# Patient Record
Sex: FEMALE | Race: WHITE | NOT HISPANIC OR LATINO | Employment: FULL TIME | URBAN - NONMETROPOLITAN AREA
[De-identification: names, ages, dates, MRNs, and addresses within clinical notes are randomized per-mention and may not be internally consistent; named-entity substitution may affect disease eponyms.]

---

## 2021-02-08 ENCOUNTER — OFFICE VISIT (OUTPATIENT)
Dept: CARDIOLOGY | Facility: CLINIC | Age: 26
End: 2021-02-08

## 2021-02-08 VITALS
SYSTOLIC BLOOD PRESSURE: 121 MMHG | HEIGHT: 63 IN | HEART RATE: 105 BPM | WEIGHT: 152.4 LBS | DIASTOLIC BLOOD PRESSURE: 81 MMHG | OXYGEN SATURATION: 100 % | BODY MASS INDEX: 27 KG/M2

## 2021-02-08 DIAGNOSIS — R06.02 SOB (SHORTNESS OF BREATH): ICD-10-CM

## 2021-02-08 DIAGNOSIS — R00.2 PALPITATIONS: ICD-10-CM

## 2021-02-08 DIAGNOSIS — R55 SYNCOPE, UNSPECIFIED SYNCOPE TYPE: ICD-10-CM

## 2021-02-08 DIAGNOSIS — R07.2 PRECORDIAL PAIN: Primary | ICD-10-CM

## 2021-02-08 PROCEDURE — 99204 OFFICE O/P NEW MOD 45 MIN: CPT | Performed by: PHYSICIAN ASSISTANT

## 2021-02-08 PROCEDURE — 93000 ELECTROCARDIOGRAM COMPLETE: CPT | Performed by: PHYSICIAN ASSISTANT

## 2021-02-08 RX ORDER — MEDROXYPROGESTERONE ACETATE 150 MG/ML
INJECTION, SUSPENSION INTRAMUSCULAR
COMMUNITY
Start: 2021-01-29 | End: 2022-04-27

## 2021-02-08 NOTE — PROGRESS NOTES
"Oleg Patel is a 25 y.o. female     Chief Complaint   Patient presents with   • Establish Care     presents for cardiac eval   • Chest Pain   • Palpitations   • Shortness of Breath   • Dizziness       HPI  The patient presents into the clinic today for initial evaluation, referred because of a recent syncopal episode, but also because of chest pain and tachycardia.  The patient tells me that she had a syncopal episode 10 years ago which resulted in evaluation through cardiology services at that time.  An echo supported a \"leaky valve\", and an event monitor by her description was inconclusive.  She was lost to follow-up through cardiology at that time.  She had remained relatively asymptomatic up until a couple of weeks ago.  At that time, the patient reports that she had a syncopal episode.  This occurred in the very early morning hours, occurring just after sexual intercourse.  The patient reports that she developed weakness, dizziness, nausea, and diaphoresis.  She then had syncope.  She tells me that she was out for just a couple of minutes and eventually EMS was alerted.  Apparently there initial evaluation was largely unremarkable and she was not taken to the emergency room.  Ultimately, she stayed with her fiancé at that time, who reported that she had significant confusion throughout the remainder of the evening and up until the next morning hours.  The patient reports that she has since done well.  She does have intermittent chest discomfort at this time.  She describes mostly precordial aching.  She will have tachycardia at that time as well.  She does feel that her dyspnea has been fairly significant with her chest tightness and her tachycardia.  She has had no failure symptoms.  She did report symptoms to her primary care provider and has now been referred on for evaluation and presents today in that setting.      Current Outpatient Medications   Medication Sig Dispense Refill   • " medroxyPROGESTERone Acetate 150 MG/ML suspension prefilled syringe INJECT IN THE MUSCLE EVERY 12 WEEKS AS DIRECTED       No current facility-administered medications for this visit.        Macrobid [nitrofurantoin macrocrystal]    Past Medical History:   Diagnosis Date   • Anxiety    • Migraines        Social History     Socioeconomic History   • Marital status: Single     Spouse name: Not on file   • Number of children: Not on file   • Years of education: Not on file   • Highest education level: Not on file   Tobacco Use   • Smoking status: Current Every Day Smoker     Years: 2.00     Types: Cigarettes   • Smokeless tobacco: Never Used   Substance and Sexual Activity   • Alcohol use: Never     Frequency: Never   • Drug use: Never       Family History   Problem Relation Age of Onset   • No Known Problems Mother    • Hypertension Father        Review of Systems   Constitutional: Positive for diaphoresis (night sweats) and fatigue. Negative for chills and fever.   Eyes: Positive for visual disturbance.   Respiratory: Positive for chest tightness and shortness of breath (with daily activity and times at rest). Negative for apnea, cough and wheezing.    Cardiovascular: Positive for chest pain, palpitations and leg swelling.   Gastrointestinal: Positive for nausea. Negative for abdominal pain, blood in stool, constipation, diarrhea and vomiting.   Endocrine: Negative.    Genitourinary: Negative.  Negative for hematuria.   Musculoskeletal: Positive for back pain. Negative for arthralgias, myalgias and neck pain.   Allergic/Immunologic: Negative.  Negative for environmental allergies and food allergies.   Neurological: Positive for dizziness (with quick movement), syncope (last episode a couple weeks ago while having sex ), numbness (feet) and headaches. Negative for weakness.   Hematological: Bruises/bleeds easily.   Psychiatric/Behavioral: Positive for agitation and sleep disturbance (up and down). The patient is  "nervous/anxious.        Objective     Vitals:    02/08/21 1357 02/08/21 1358 02/08/21 1359   BP: 123/84 114/77 121/81   BP Location: Left arm Left arm Left arm   Patient Position: Lying Sitting Standing   Pulse: 111 105 105   SpO2: 100%     Weight: 69.1 kg (152 lb 6.4 oz)     Height: 160 cm (63\")          /81 (BP Location: Left arm, Patient Position: Standing)   Pulse 105   Ht 160 cm (63\")   Wt 69.1 kg (152 lb 6.4 oz)   SpO2 100%   BMI 27.00 kg/m²      Lab Results (most recent)     None          Physical Exam  Vitals signs and nursing note reviewed.   Constitutional:       General: She is not in acute distress.     Appearance: She is well-developed.   HENT:      Head: Normocephalic and atraumatic.   Eyes:      Conjunctiva/sclera: Conjunctivae normal.      Pupils: Pupils are equal, round, and reactive to light.   Neck:      Musculoskeletal: Normal range of motion and neck supple.      Vascular: No JVD.      Trachea: No tracheal deviation.   Cardiovascular:      Rate and Rhythm: Normal rate and regular rhythm.      Heart sounds: Normal heart sounds.   Pulmonary:      Effort: Pulmonary effort is normal.      Breath sounds: Normal breath sounds.   Abdominal:      General: Bowel sounds are normal. There is no distension.      Palpations: Abdomen is soft. There is no mass.      Tenderness: There is no abdominal tenderness. There is no guarding or rebound.   Musculoskeletal: Normal range of motion.         General: No tenderness or deformity.   Skin:     General: Skin is warm and dry.      Coloration: Skin is not pale.      Findings: No erythema or rash.   Neurological:      Mental Status: She is alert and oriented to person, place, and time.   Psychiatric:         Behavior: Behavior normal.         Thought Content: Thought content normal.         Judgment: Judgment normal.         Procedure     ECG 12 Lead    Date/Time: 2/8/2021 2:03 PM  Performed by: Dewayne Larose PA  Authorized by: Dewayne Larose PA "   Previous ECG: no previous ECG available  Comments: Sinus rhythm at 91, normal axis, early repolarization, no acute changes noted.                 Assessment/Plan      Diagnosis Plan   1. Precordial pain  ECG 12 Lead    Adult Transthoracic Echo Complete W/ Cont if Necessary Per Protocol    Cardiac Event Monitor    Treadmill Stress Test   2. Palpitations  ECG 12 Lead    Adult Transthoracic Echo Complete W/ Cont if Necessary Per Protocol    Cardiac Event Monitor    Treadmill Stress Test   3. SOB (shortness of breath)  ECG 12 Lead    Adult Transthoracic Echo Complete W/ Cont if Necessary Per Protocol    Cardiac Event Monitor    Treadmill Stress Test   4. Syncope, unspecified syncope type  ECG 12 Lead    Adult Transthoracic Echo Complete W/ Cont if Necessary Per Protocol    Cardiac Event Monitor    Treadmill Stress Test     1.  With the patient's syncope and symptoms otherwise, I will schedule for an event monitor.  We will schedule for a 14-day event monitor testing.  This will allow us to evaluate for dysrhythmic substrates potentially contributing to clinical course.    2.  I would schedule for an echo as well so that we may evaluate LV size and function, valvular morphologies, etc.    3.  I would schedule for regular treadmill stress test for risk stratification.    4.  The patient's orthostatic blood pressure checks have been unremarkable.  We have reviewed that with her today.    5.  If the above is unremarkable and symptoms should persist, I would then have a very low threshold for consideration of tilt table testing.  She has components to suggest potential neurocardiogenic syncope by description.  Still, I would like to pursue the above and then will recommend her further at that time if needed.  She will report back immediately for complications.  Further pending above evaluation and clinical course.           Wilma Patel  reports that she has been smoking cigarettes. She has smoked for the past 2.00  years. She has never used smokeless tobacco.. I have educated her on the risk of diseases from using tobacco products such as cancer, COPD and heart disease.     I advised her to quit and she is not willing to quit.    I spent 3  minutes counseling the patient.          Patient's Body mass index is 27 kg/m². BMI is above normal parameters. Recommendations include: educational material.           Electronically signed by:

## 2021-02-08 NOTE — PATIENT INSTRUCTIONS
For more information:    Quit Now [place]  1-800-QUIT-NOW  https://[place].quitlogix.org/en-[place]/  Steps to Quit Smoking  Smoking tobacco can be harmful to your health and can affect almost every organ in your body. Smoking puts you, and those around you, at risk for developing many serious chronic diseases. Quitting smoking is difficult, but it is one of the best things that you can do for your health. It is never too late to quit.  What are the benefits of quitting smoking?  When you quit smoking, you lower your risk of developing serious diseases and conditions, such as:  · Lung cancer or lung disease, such as COPD.  · Heart disease.  · Stroke.  · Heart attack.  · Infertility.  · Osteoporosis and bone fractures.  Additionally, symptoms such as coughing, wheezing, and shortness of breath may get better when you quit. You may also find that you get sick less often because your body is stronger at fighting off colds and infections. If you are pregnant, quitting smoking can help to reduce your chances of having a baby of low birth weight.  How do I get ready to quit?  When you decide to quit smoking, create a plan to make sure that you are successful. Before you quit:  · Pick a date to quit. Set a date within the next two weeks to give you time to prepare.  · Write down the reasons why you are quitting. Keep this list in places where you will see it often, such as on your bathroom mirror or in your car or wallet.  · Identify the people, places, things, and activities that make you want to smoke (triggers) and avoid them. Make sure to take these actions:  ¨ Throw away all cigarettes at home, at work, and in your car.  ¨ Throw away smoking accessories, such as ashtrays and lighters.  ¨ Clean your car and make sure to empty the ashtray.  ¨ Clean your home, including curtains and carpets.  · Tell your family, friends, and coworkers that you are quitting. Support from your loved ones can make quitting easier.  · Talk with  your health care provider about your options for quitting smoking.  · Find out what treatment options are covered by your health insurance.  What strategies can I use to quit smoking?  Talk with your healthcare provider about different strategies to quit smoking. Some strategies include:  · Quitting smoking altogether instead of gradually lessening how much you smoke over a period of time. Research shows that quitting “cold turkey” is more successful than gradually quitting.  · Attending in-person counseling to help you build problem-solving skills. You are more likely to have success in quitting if you attend several counseling sessions. Even short sessions of 10 minutes can be effective.  · Finding resources and support systems that can help you to quit smoking and remain smoke-free after you quit. These resources are most helpful when you use them often. They can include:  ¨ Online chats with a counselor.  ¨ Telephone quitlines.  ¨ Printed self-help materials.  ¨ Support groups or group counseling.  ¨ Text messaging programs.  ¨ Mobile phone applications.  · Taking medicines to help you quit smoking. (If you are pregnant or breastfeeding, talk with your health care provider first.) Some medicines contain nicotine and some do not. Both types of medicines help with cravings, but the medicines that include nicotine help to relieve withdrawal symptoms. Your health care provider may recommend:  ¨ Nicotine patches, gum, or lozenges.  ¨ Nicotine inhalers or sprays.  ¨ Non-nicotine medicine that is taken by mouth.  Talk with your health care provider about combining strategies, such as taking medicines while you are also receiving in-person counseling. Using these two strategies together makes you more likely to succeed in quitting than if you used either strategy on its own.  If you are pregnant or breastfeeding, talk with your health care provider about finding counseling or other support strategies to quit smoking. Do  not take medicine to help you quit smoking unless told to do so by your health care provider.  What things can I do to make it easier to quit?  Quitting smoking might feel overwhelming at first, but there is a lot that you can do to make it easier. Take these important actions:  · Reach out to your family and friends and ask that they support and encourage you during this time. Call telephone quitlines, reach out to support groups, or work with a counselor for support.  · Ask people who smoke to avoid smoking around you.  · Avoid places that trigger you to smoke, such as bars, parties, or smoke-break areas at work.  · Spend time around people who do not smoke.  · Lessen stress in your life, because stress can be a smoking trigger for some people. To lessen stress, try:  ¨ Exercising regularly.  ¨ Deep-breathing exercises.  ¨ Yoga.  ¨ Meditating.  ¨ Performing a body scan. This involves closing your eyes, scanning your body from head to toe, and noticing which parts of your body are particularly tense. Purposefully relax the muscles in those areas.  · Download or purchase mobile phone or tablet apps (applications) that can help you stick to your quit plan by providing reminders, tips, and encouragement. There are many free apps, such as QuitGuide from the CDC (Centers for Disease Control and Prevention). You can find other support for quitting smoking (smoking cessation) through smokefree.gov and other websites.  How will I feel when I quit smoking?  Within the first 24 hours of quitting smoking, you may start to feel some withdrawal symptoms. These symptoms are usually most noticeable 2-3 days after quitting, but they usually do not last beyond 2-3 weeks. Changes or symptoms that you might experience include:  · Mood swings.  · Restlessness, anxiety, or irritation.  · Difficulty concentrating.  · Dizziness.  · Strong cravings for sugary foods in addition to nicotine.  · Mild weight  gain.  · Constipation.  · Nausea.  · Coughing or a sore throat.  · Changes in how your medicines work in your body.  · A depressed mood.  · Difficulty sleeping (insomnia).  After the first 2-3 weeks of quitting, you may start to notice more positive results, such as:  · Improved sense of smell and taste.  · Decreased coughing and sore throat.  · Slower heart rate.  · Lower blood pressure.  · Clearer skin.  · The ability to breathe more easily.  · Fewer sick days.  Quitting smoking is very challenging for most people. Do not get discouraged if you are not successful the first time. Some people need to make many attempts to quit before they achieve long-term success. Do your best to stick to your quit plan, and talk with your health care provider if you have any questions or concerns.  This information is not intended to replace advice given to you by your health care provider. Make sure you discuss any questions you have with your health care provider.  Document Released: 12/12/2002 Document Revised: 08/15/2017 Document Reviewed: 05/03/2016  [employer] Patient Education © 2017 [employer]  Heart-Healthy Eating Plan  Many factors influence your heart (coronary) health, including eating and exercise habits. Coronary risk increases with abnormal blood fat (lipid) levels. Heart-healthy meal planning includes limiting unhealthy fats, increasing healthy fats, and making other diet and lifestyle changes.  What is my plan?  Your health care provider may recommend that you:  · Limit your fat intake to _________% or less of your total calories each day.  · Limit your saturated fat intake to _________% or less of your total calories each day.  · Limit the amount of cholesterol in your diet to less than _________ mg per day.  What are tips for following this plan?  Cooking  Cook foods using methods other than frying. Baking, boiling, grilling, and broiling are all good options. Other ways to reduce fat  include:  · Removing the skin from poultry.  · Removing all visible fats from meats.  · Steaming vegetables in water or broth.  Meal planning    · At meals, imagine dividing your plate into fourths:  ? Fill one-half of your plate with vegetables and green salads.  ? Fill one-fourth of your plate with whole grains.  ? Fill one-fourth of your plate with lean protein foods.  · Eat 4-5 servings of vegetables per day. One serving equals 1 cup raw or cooked vegetable, or 2 cups raw leafy greens.  · Eat 4-5 servings of fruit per day. One serving equals 1 medium whole fruit, ¼ cup dried fruit, ½ cup fresh, frozen, or canned fruit, or ½ cup 100% fruit juice.  · Eat more foods that contain soluble fiber. Examples include apples, broccoli, carrots, beans, peas, and barley. Aim to get 25-30 g of fiber per day.  · Increase your consumption of legumes, nuts, and seeds to 4-5 servings per week. One serving of dried beans or legumes equals ½ cup cooked, 1 serving of nuts is ¼ cup, and 1 serving of seeds equals 1 tablespoon.  Fats  · Choose healthy fats more often. Choose monounsaturated and polyunsaturated fats, such as olive and canola oils, flaxseeds, walnuts, almonds, and seeds.  · Eat more omega-3 fats. Choose salmon, mackerel, sardines, tuna, flaxseed oil, and ground flaxseeds. Aim to eat fish at least 2 times each week.  · Check food labels carefully to identify foods with trans fats or high amounts of saturated fat.  · Limit saturated fats. These are found in animal products, such as meats, butter, and cream. Plant sources of saturated fats include palm oil, palm kernel oil, and coconut oil.  · Avoid foods with partially hydrogenated oils in them. These contain trans fats. Examples are stick margarine, some tub margarines, cookies, crackers, and other baked goods.  · Avoid fried foods.  General information  · Eat more home-cooked food and less restaurant, buffet, and fast food.  · Limit or avoid alcohol.  · Limit foods that  are high in starch and sugar.  · Lose weight if you are overweight. Losing just 5-10% of your body weight can help your overall health and prevent diseases such as diabetes and heart disease.  · Monitor your salt (sodium) intake, especially if you have high blood pressure. Talk with your health care provider about your sodium intake.  · Try to incorporate more vegetarian meals weekly.  What foods can I eat?  Fruits  All fresh, canned (in natural juice), or frozen fruits.  Vegetables  Fresh or frozen vegetables (raw, steamed, roasted, or grilled). Green salads.  Grains  Most grains. Choose whole wheat and whole grains most of the time. Rice and pasta, including brown rice and pastas made with whole wheat.  Meats and other proteins  Lean, well-trimmed beef, veal, pork, and lamb. Chicken and turkey without skin. All fish and shellfish. Wild duck, rabbit, pheasant, and venison. Egg whites or low-cholesterol egg substitutes. Dried beans, peas, lentils, and tofu. Seeds and most nuts.  Dairy  Low-fat or nonfat cheeses, including ricotta and mozzarella. Skim or 1% milk (liquid, powdered, or evaporated). Buttermilk made with low-fat milk. Nonfat or low-fat yogurt.  Fats and oils  Non-hydrogenated (trans-free) margarines. Vegetable oils, including soybean, sesame, sunflower, olive, peanut, safflower, corn, canola, and cottonseed. Salad dressings or mayonnaise made with a vegetable oil.  Beverages  Water (mineral or sparkling). Coffee and tea. Diet carbonated beverages.  Sweets and desserts  Sherbet, gelatin, and fruit ice. Small amounts of dark chocolate.  Limit all sweets and desserts.  Seasonings and condiments  All seasonings and condiments.  The items listed above may not be a complete list of foods and beverages you can eat. Contact a dietitian for more options.  What foods are not recommended?  Fruits  Canned fruit in heavy syrup. Fruit in cream or butter sauce. Fried fruit. Limit coconut.  Vegetables  Vegetables cooked  in cheese, cream, or butter sauce. Fried vegetables.  Grains  Breads made with saturated or trans fats, oils, or whole milk. Croissants. Sweet rolls. Donuts. High-fat crackers, such as cheese crackers.  Meats and other proteins  Fatty meats, such as hot dogs, ribs, sausage, marie, rib-eye roast or steak. High-fat deli meats, such as salami and bologna. Caviar. Domestic duck and goose. Organ meats, such as liver.  Dairy  Cream, sour cream, cream cheese, and creamed cottage cheese. Whole milk cheeses. Whole or 2% milk (liquid, evaporated, or condensed). Whole buttermilk. Cream sauce or high-fat cheese sauce. Whole-milk yogurt.  Fats and oils  Meat fat, or shortening. Cocoa butter, hydrogenated oils, palm oil, coconut oil, palm kernel oil. Solid fats and shortenings, including marie fat, salt pork, lard, and butter. Nondairy cream substitutes. Salad dressings with cheese or sour cream.  Beverages  Regular sodas and any drinks with added sugar.  Sweets and desserts  Frosting. Pudding. Cookies. Cakes. Pies. Milk chocolate or white chocolate. Buttered syrups. Full-fat ice cream or ice cream drinks.  The items listed above may not be a complete list of foods and beverages to avoid. Contact a dietitian for more information.  Summary  · Heart-healthy meal planning includes limiting unhealthy fats, increasing healthy fats, and making other diet and lifestyle changes.  · Lose weight if you are overweight. Losing just 5-10% of your body weight can help your overall health and prevent diseases such as diabetes and heart disease.  · Focus on eating a balance of foods, including fruits and vegetables, low-fat or nonfat dairy, lean protein, nuts and legumes, whole grains, and heart-healthy oils and fats.  This information is not intended to replace advice given to you by your health care provider. Make sure you discuss any questions you have with your health care provider.  Document Revised: 01/25/2019 Document Reviewed:  01/25/2019  [employer] Patient Education © 2020 [employer]

## 2021-02-08 NOTE — PROGRESS NOTES
Subjective   Wilma Patel is a 25 y.o. female     Chief Complaint   Patient presents with   • Establish Care       HPI        Current Outpatient Medications   Medication Sig Dispense Refill   • medroxyPROGESTERone Acetate 150 MG/ML suspension prefilled syringe INJECT IN THE MUSCLE EVERY 12 WEEKS AS DIRECTED       No current facility-administered medications for this visit.        Macrobid [nitrofurantoin macrocrystal]    Past Medical History:   Diagnosis Date   • Anxiety    • Migraines        Social History     Socioeconomic History   • Marital status: Single     Spouse name: Not on file   • Number of children: Not on file   • Years of education: Not on file   • Highest education level: Not on file       No family history on file.    Review of Systems    Objective     There were no vitals filed for this visit.     There were no vitals taken for this visit.     Lab Results (most recent)     None          Physical Exam    Procedure   Procedures         Assessment/Plan     No diagnosis found.                 Electronically signed by:

## 2021-02-23 ENCOUNTER — TELEPHONE (OUTPATIENT)
Dept: CARDIOLOGY | Facility: CLINIC | Age: 26
End: 2021-02-23

## 2021-02-23 NOTE — TELEPHONE ENCOUNTER
Patient called triage - she has received her heart monitor and used it Friday night , woke up Saturday with redness on her chest , thinking to adhesive it to strong, spoke with patient told her she can call the company and have them send her sensitive strips, if those continue to bother her we will talk to a provider and see what else to do. AT [facility]

## 2021-03-25 ENCOUNTER — HOSPITAL ENCOUNTER (OUTPATIENT)
Dept: CARDIOLOGY | Facility: HOSPITAL | Age: 26
Discharge: HOME | End: 2021-03-25

## 2021-03-25 DIAGNOSIS — R00.2 PALPITATIONS: ICD-10-CM

## 2021-03-25 DIAGNOSIS — R55 SYNCOPE, UNSPECIFIED SYNCOPE TYPE: ICD-10-CM

## 2021-03-25 DIAGNOSIS — R06.02 SOB (SHORTNESS OF BREATH): ICD-10-CM

## 2021-03-25 DIAGNOSIS — R07.2 PRECORDIAL PAIN: ICD-10-CM

## 2021-03-25 PROCEDURE — 93306 TTE W/DOPPLER COMPLETE: CPT | Performed by: INTERNAL MEDICINE

## 2021-03-25 PROCEDURE — 93018 CV STRESS TEST I&R ONLY: CPT | Performed by: INTERNAL MEDICINE

## 2021-03-25 PROCEDURE — 93016 CV STRESS TEST SUPVJ ONLY: CPT | Performed by: NURSE PRACTITIONER

## 2021-03-25 PROCEDURE — 93017 CV STRESS TEST TRACING ONLY: CPT

## 2021-03-25 PROCEDURE — 93306 TTE W/DOPPLER COMPLETE: CPT

## 2021-03-28 LAB
BH CV STRESS BP STAGE 1: NORMAL
BH CV STRESS BP STAGE 2: NORMAL
BH CV STRESS BP STAGE 3: NORMAL
BH CV STRESS DURATION MIN STAGE 1: 3
BH CV STRESS DURATION MIN STAGE 2: 3
BH CV STRESS DURATION MIN STAGE 3: 3
BH CV STRESS DURATION SEC STAGE 1: 0
BH CV STRESS DURATION SEC STAGE 2: 0
BH CV STRESS DURATION SEC STAGE 3: 0
BH CV STRESS GRADE STAGE 1: 10
BH CV STRESS GRADE STAGE 2: 12
BH CV STRESS GRADE STAGE 3: 14
BH CV STRESS HR STAGE 1: 130
BH CV STRESS HR STAGE 2: 153
BH CV STRESS HR STAGE 3: 178
BH CV STRESS METS STAGE 1: 5
BH CV STRESS METS STAGE 2: 7.5
BH CV STRESS METS STAGE 3: 10
BH CV STRESS PROTOCOL 1: NORMAL
BH CV STRESS RECOVERY BP: NORMAL MMHG
BH CV STRESS RECOVERY HR: 98 BPM
BH CV STRESS SPEED STAGE 1: 1.7
BH CV STRESS SPEED STAGE 2: 2.5
BH CV STRESS SPEED STAGE 3: 3.4
BH CV STRESS STAGE 1: 1
BH CV STRESS STAGE 2: 2
BH CV STRESS STAGE 3: 3
MAXIMAL PREDICTED HEART RATE: 195 BPM
PERCENT MAX PREDICTED HR: 91.28 %
STRESS BASELINE BP: NORMAL MMHG
STRESS BASELINE HR: 71 BPM
STRESS PERCENT HR: 107 %
STRESS POST ESTIMATED WORKLOAD: 10.1 METS
STRESS POST EXERCISE DUR MIN: 7 MIN
STRESS POST EXERCISE DUR SEC: 10 SEC
STRESS POST PEAK BP: NORMAL MMHG
STRESS POST PEAK HR: 178 BPM
STRESS TARGET HR: 166 BPM

## 2021-03-30 ENCOUNTER — TELEPHONE (OUTPATIENT)
Dept: CARDIOLOGY | Facility: CLINIC | Age: 26
End: 2021-03-30

## 2021-03-30 NOTE — TELEPHONE ENCOUNTER
Patient aware of stress test results and doing well. She will keep follow up as scheduled and call with any concerns.     ----- Message from CATHY Gonzalez sent at 3/30/2021  8:36 AM EDT -----  Routine follow-up.    Result Text  1.  Adequate functional capacity without symptoms.  The patient exercised 7 minutes and 10 seconds on a Callum protocol to a heart rate of 178, systolic blood pressure 161, a rate-pressure product of 27,000, and an O2 consumption of 10.1 METS.  The patient achieved 91% of age adjusted maximum predicted heart rate.     2.  Somewhat exaggerated heart rate and physiologic blood pressure responses to exercise.     3.  No EKG evidence of ischemia.     4.  No exercise-induced dysrhythmia.

## 2021-04-08 LAB
BH CV ECHO MEAS - ACS: 2.3 CM
BH CV ECHO MEAS - AO MAX PG: 3.9 MMHG
BH CV ECHO MEAS - AO MEAN PG: 2 MMHG
BH CV ECHO MEAS - AO ROOT AREA (BSA CORRECTED): 1.8
BH CV ECHO MEAS - AO ROOT AREA: 7.5 CM^2
BH CV ECHO MEAS - AO ROOT DIAM: 3.1 CM
BH CV ECHO MEAS - AO V2 MAX: 99 CM/SEC
BH CV ECHO MEAS - AO V2 MEAN: 72.2 CM/SEC
BH CV ECHO MEAS - AO V2 VTI: 21 CM
BH CV ECHO MEAS - BSA(HAYCOCK): 1.8 M^2
BH CV ECHO MEAS - BSA: 1.7 M^2
BH CV ECHO MEAS - BZI_BMI: 26.9 KILOGRAMS/M^2
BH CV ECHO MEAS - BZI_METRIC_HEIGHT: 160 CM
BH CV ECHO MEAS - BZI_METRIC_WEIGHT: 68.9 KG
BH CV ECHO MEAS - EDV(CUBED): 109.2 ML
BH CV ECHO MEAS - EDV(MOD-SP4): 58.1 ML
BH CV ECHO MEAS - EDV(TEICH): 106.5 ML
BH CV ECHO MEAS - EF(CUBED): 62.1 %
BH CV ECHO MEAS - EF(MOD-SP4): 47.5 %
BH CV ECHO MEAS - EF(TEICH): 53.5 %
BH CV ECHO MEAS - ESV(CUBED): 41.4 ML
BH CV ECHO MEAS - ESV(MOD-SP4): 30.5 ML
BH CV ECHO MEAS - ESV(TEICH): 49.5 ML
BH CV ECHO MEAS - FS: 27.6 %
BH CV ECHO MEAS - IVS/LVPW: 1.1
BH CV ECHO MEAS - IVSD: 0.93 CM
BH CV ECHO MEAS - LA DIMENSION: 3.5 CM
BH CV ECHO MEAS - LA/AO: 1.1
BH CV ECHO MEAS - LV DIASTOLIC VOL/BSA (35-75): 33.8 ML/M^2
BH CV ECHO MEAS - LV IVRT: 0.11 SEC
BH CV ECHO MEAS - LV MASS(C)D: 140.9 GRAMS
BH CV ECHO MEAS - LV MASS(C)DI: 81.9 GRAMS/M^2
BH CV ECHO MEAS - LV SYSTOLIC VOL/BSA (12-30): 17.7 ML/M^2
BH CV ECHO MEAS - LVIDD: 4.8 CM
BH CV ECHO MEAS - LVIDS: 3.5 CM
BH CV ECHO MEAS - LVLD AP4: 6 CM
BH CV ECHO MEAS - LVLS AP4: 5 CM
BH CV ECHO MEAS - LVOT AREA (M): 3.5 CM^2
BH CV ECHO MEAS - LVOT AREA: 3.5 CM^2
BH CV ECHO MEAS - LVOT DIAM: 2.1 CM
BH CV ECHO MEAS - LVPWD: 0.81 CM
BH CV ECHO MEAS - MV A MAX VEL: 51 CM/SEC
BH CV ECHO MEAS - MV DEC SLOPE: 161 CM/SEC^2
BH CV ECHO MEAS - MV E MAX VEL: 64.3 CM/SEC
BH CV ECHO MEAS - MV E/A: 1.3
BH CV ECHO MEAS - RVDD: 3 CM
BH CV ECHO MEAS - SI(AO): 92.1 ML/M^2
BH CV ECHO MEAS - SI(CUBED): 39.4 ML/M^2
BH CV ECHO MEAS - SI(MOD-SP4): 16 ML/M^2
BH CV ECHO MEAS - SI(TEICH): 33.1 ML/M^2
BH CV ECHO MEAS - SV(AO): 158.5 ML
BH CV ECHO MEAS - SV(CUBED): 67.8 ML
BH CV ECHO MEAS - SV(MOD-SP4): 27.6 ML
BH CV ECHO MEAS - SV(TEICH): 57 ML
MAXIMAL PREDICTED HEART RATE: 195 BPM
STRESS TARGET HR: 166 BPM

## 2021-04-15 ENCOUNTER — TELEPHONE (OUTPATIENT)
Dept: CARDIOLOGY | Facility: CLINIC | Age: 26
End: 2021-04-15

## 2021-04-15 NOTE — TELEPHONE ENCOUNTER
Patient aware of echo results. Follow up moved to Monday.Blanca Pressley MA      ----- Message from Bridgett Rowe sent at 4/15/2021  1:04 PM EDT -----    ----- Message -----  From: Dewayne Larose PA  Sent: 4/14/2021   5:57 PM EDT  To: Babs De Jesus MA    Follow-up 2 to 3 weeks.  Please advise patient of test results.       Result Text  1.  LV size is normal and global LV systolic function is in the low normal to mildly depressed range with a visually estimated ejection fraction of 45 to 50%.  Normal LV wall thickness, normal LV wall motion, and normal LV diastolic function and filling pressures.  The atria and right ventricle are unremarkable.     2.  Valves are morphologically and physiologically normal with no stenotic lesions, valve associated masses or thrombi, or hemodynamically significant regurgitation.     3.  No pericardial or great vessel pathology.     4.  Pulmonary artery pressures cannot be calculated but are likely not significantly elevated.

## 2021-04-19 ENCOUNTER — OFFICE VISIT (OUTPATIENT)
Dept: CARDIOLOGY | Facility: CLINIC | Age: 26
End: 2021-04-19

## 2021-04-19 VITALS
HEIGHT: 63 IN | BODY MASS INDEX: 29.91 KG/M2 | DIASTOLIC BLOOD PRESSURE: 80 MMHG | HEART RATE: 98 BPM | SYSTOLIC BLOOD PRESSURE: 112 MMHG | WEIGHT: 168.8 LBS | OXYGEN SATURATION: 99 %

## 2021-04-19 DIAGNOSIS — R55 SYNCOPE, UNSPECIFIED SYNCOPE TYPE: Primary | ICD-10-CM

## 2021-04-19 DIAGNOSIS — R00.2 PALPITATIONS: ICD-10-CM

## 2021-04-19 DIAGNOSIS — R06.02 SOB (SHORTNESS OF BREATH): ICD-10-CM

## 2021-04-19 PROCEDURE — 99213 OFFICE O/P EST LOW 20 MIN: CPT | Performed by: PHYSICIAN ASSISTANT

## 2021-04-19 NOTE — PROGRESS NOTES
Problem list     Subjective   Wilma Patel is a 25 y.o. female     Chief Complaint   Patient presents with   • Follow-up       HPI  This pleasant patient presents to the clinic today to review study results.  We had seen her initially because of presyncopal episodes/syncope, chest discomfort, and dyspnea.  She was subsequent scheduled for testing.  Event monitor indicated sinus rhythm throughout without significant dysrhythmic activity.  Stress test indicated no evidence of ischemia, with exercise/peak heart rate occurring at 7 minutes and 10 seconds per Callum protocol.  She had no ischemic changes nor dysrhythmic activity.  Echo supported possible mild depression in systolic function EF 45 to 50% but with normal LV diastolic function and normal parameters otherwise.    Current Outpatient Medications on File Prior to Visit   Medication Sig Dispense Refill   • medroxyPROGESTERone Acetate 150 MG/ML suspension prefilled syringe INJECT IN THE MUSCLE EVERY 12 WEEKS AS DIRECTED       No current facility-administered medications on file prior to visit.       Macrobid [nitrofurantoin macrocrystal]    Past Medical History:   Diagnosis Date   • Anxiety    • Migraines        Social History     Socioeconomic History   • Marital status: Single     Spouse name: Not on file   • Number of children: Not on file   • Years of education: Not on file   • Highest education level: Not on file   Tobacco Use   • Smoking status: Current Every Day Smoker     Years: 2.00     Types: Cigarettes   • Smokeless tobacco: Never Used   Substance and Sexual Activity   • Alcohol use: Never   • Drug use: Never       Family History   Problem Relation Age of Onset   • No Known Problems Mother    • Hypertension Father        Review of Systems   Constitutional: Negative.  Negative for chills, fatigue and fever.   HENT: Negative for congestion, rhinorrhea and sore throat.    Eyes: Positive for visual disturbance (glasses).   Respiratory: Negative for chest  "tightness, shortness of breath and wheezing.    Cardiovascular: Positive for leg swelling. Negative for chest pain and palpitations.   Gastrointestinal: Negative.    Endocrine: Negative.  Negative for cold intolerance.   Genitourinary: Negative.    Musculoskeletal: Negative.  Negative for arthralgias, back pain and neck pain.   Skin: Negative.  Negative for rash and wound.   Allergic/Immunologic: Positive for environmental allergies.   Neurological: Positive for numbness (legs) and headaches. Negative for dizziness and weakness.   Hematological: Bruises/bleeds easily (bruises/ bleeds).   Psychiatric/Behavioral: Negative for sleep disturbance (staying asleep ).       Objective   Vitals:    04/19/21 1126   BP: 112/80   BP Location: Left arm   Patient Position: Sitting   Pulse: 98   SpO2: 99%   Weight: 76.6 kg (168 lb 12.8 oz)   Height: 160 cm (63\")      /80 (BP Location: Left arm, Patient Position: Sitting)   Pulse 98   Ht 160 cm (63\")   Wt 76.6 kg (168 lb 12.8 oz)   SpO2 99%   BMI 29.90 kg/m²    Lab Results (most recent)     None        Physical Exam  Vitals and nursing note reviewed.   Constitutional:       General: She is not in acute distress.     Appearance: She is well-developed.   HENT:      Head: Normocephalic and atraumatic.   Eyes:      Conjunctiva/sclera: Conjunctivae normal.      Pupils: Pupils are equal, round, and reactive to light.   Neck:      Vascular: No JVD.      Trachea: No tracheal deviation.   Cardiovascular:      Rate and Rhythm: Normal rate and regular rhythm.      Heart sounds: Normal heart sounds.   Pulmonary:      Effort: Pulmonary effort is normal.      Breath sounds: Normal breath sounds.   Abdominal:      General: Bowel sounds are normal. There is no distension.      Palpations: Abdomen is soft. There is no mass.      Tenderness: There is no abdominal tenderness. There is no guarding or rebound.   Musculoskeletal:         General: No tenderness or deformity. Normal range of " motion.      Cervical back: Normal range of motion and neck supple.   Skin:     General: Skin is warm and dry.      Coloration: Skin is not pale.      Findings: No erythema or rash.   Neurological:      Mental Status: She is alert and oriented to person, place, and time.   Psychiatric:         Behavior: Behavior normal.         Thought Content: Thought content normal.         Judgment: Judgment normal.           Procedure   Procedures       Assessment/Plan      Diagnosis Plan   1. Syncope, unspecified syncope type     2. SOB (shortness of breath)     3. Palpitations       1.  The patient presents today to review study results.  Because of syncope and symptoms otherwise as above, the patient was scheduled for testing.  Stress test indicated no evidence of ischemia.  Event monitor indicated no dysrhythmic symptoms.  We have reviewed that in detail with her today.    2.  She has had no further syncopal episodes.  For future episodes, I would have a low threshold for consideration of tilt table testing.  She has some components of vasovagal syncope by description.    3.  In that setting, we have reviewed physical maneuvers/lifestyle changes to address empirically vasovagal syncope.    4.  Her echo did support a low normal to mildly depressed systolic function estimated EF at 45 to 50%.  She however had normal diastolic function and normal parameters otherwise.  I do not think this reflects any significant pathology at this time, but would repeat a limited echo in 2 to 3 months to reevaluate systolic function, LV size, diastolic parameters, and cardiac anatomy otherwise.  She will call for any issues immediately prior to follow-up on that echocardiogram.    5.  I will make no adjustments in medications.  We will see her routinely.  For the most part, I feel she is doing fairly well at this time from cardiovascular standpoint.           Wilma Patel  reports that she has been smoking cigarettes. She has smoked for the  past 2.00 years. She has never used smokeless tobacco.. I have educated her on the risk of diseases from using tobacco products such as cancer, COPD and heart disease.     I advised her to quit and she is not willing to quit.    I spent 3  minutes counseling the patient.          Patient's Body mass index is 29.9 kg/m². BMI is above normal parameters. Recommendations include: educational material.             Electronically signed by:

## 2021-04-19 NOTE — PATIENT INSTRUCTIONS

## 2022-04-27 ENCOUNTER — OFFICE VISIT (OUTPATIENT)
Dept: CARDIOLOGY | Facility: CLINIC | Age: 27
End: 2022-04-27

## 2022-04-27 VITALS
SYSTOLIC BLOOD PRESSURE: 118 MMHG | HEIGHT: 66 IN | BODY MASS INDEX: 30.05 KG/M2 | WEIGHT: 187 LBS | DIASTOLIC BLOOD PRESSURE: 77 MMHG | HEART RATE: 76 BPM | OXYGEN SATURATION: 99 %

## 2022-04-27 DIAGNOSIS — R07.9 CHEST PAIN, UNSPECIFIED TYPE: Primary | ICD-10-CM

## 2022-04-27 DIAGNOSIS — R00.2 PALPITATIONS: ICD-10-CM

## 2022-04-27 DIAGNOSIS — R06.02 SOB (SHORTNESS OF BREATH): ICD-10-CM

## 2022-04-27 DIAGNOSIS — R55 SYNCOPE AND COLLAPSE: ICD-10-CM

## 2022-04-27 PROCEDURE — 93000 ELECTROCARDIOGRAM COMPLETE: CPT | Performed by: PHYSICIAN ASSISTANT

## 2022-04-27 PROCEDURE — 99214 OFFICE O/P EST MOD 30 MIN: CPT | Performed by: PHYSICIAN ASSISTANT

## 2022-04-27 RX ORDER — HYDROXYZINE HYDROCHLORIDE 10 MG/1
10 TABLET, FILM COATED ORAL EVERY 8 HOURS PRN
COMMUNITY
Start: 2022-04-07 | End: 2022-04-27

## 2022-04-27 RX ORDER — BUPROPION HYDROCHLORIDE 75 MG/1
75 TABLET ORAL 2 TIMES DAILY
COMMUNITY
Start: 2022-04-07

## 2022-04-27 RX ORDER — METOPROLOL SUCCINATE 25 MG/1
12.5 TABLET, EXTENDED RELEASE ORAL DAILY
Qty: 30 TABLET | Refills: 11 | Status: SHIPPED | OUTPATIENT
Start: 2022-04-27

## 2022-04-27 NOTE — PROGRESS NOTES
Problem list     Subjective   Wilma Patel is a 26 y.o. female     Chief Complaint   Patient presents with   • Palpitations       HPI    Patient is a 26-year-old female who presents to the office for evaluation.  Patient has been followed routinely in the office.  She has had history of syncopal episodes and a degree of neurocardiogenic syncope.  Patient underwent testing in 2021 to include event monitor with no significant arrhythmia other than rare premature beats.  Patient with regular treadmill stress test that was negative for any electrocardiographic evidence of ischemia and echo was largely benign as well    Patient continues to feel palpitations.  She will feel as if her heart is racing or beating irregular at times.  She has a degree of mild dyspnea when exerting or doing activity.  No complaints of PND orthopnea.    Patient does occasionally feel some slight discomfort in her chest but most of her symptoms are chronic.  Otherwise patient is doing well.    Current Outpatient Medications on File Prior to Visit   Medication Sig Dispense Refill   • buPROPion (WELLBUTRIN) 75 MG tablet Take 75 mg by mouth 2 (Two) Times a Day.     • [DISCONTINUED] hydrOXYzine (ATARAX) 10 MG tablet Take 10 mg by mouth Every 8 (Eight) Hours As Needed.     • [DISCONTINUED] medroxyPROGESTERone Acetate 150 MG/ML suspension prefilled syringe INJECT IN THE MUSCLE EVERY 12 WEEKS AS DIRECTED       No current facility-administered medications on file prior to visit.       Latex and Macrobid [nitrofurantoin macrocrystal]    Past Medical History:   Diagnosis Date   • Anxiety    • Depression    • Migraines        Social History     Socioeconomic History   • Marital status: Single   Tobacco Use   • Smoking status: Former Smoker     Years: 2.00     Types: Cigarettes   • Smokeless tobacco: Never Used   Substance and Sexual Activity   • Alcohol use: Never   • Drug use: Never       Family History   Problem Relation Age of Onset   • No Known  "Problems Mother    • Hypertension Father        Review of Systems   Constitutional: Positive for fatigue. Negative for chills and fever.   HENT: Negative.  Negative for congestion and sinus pressure.    Respiratory: Positive for shortness of breath. Negative for chest tightness.    Cardiovascular: Positive for chest pain, palpitations and leg swelling.   Musculoskeletal: Positive for back pain (between shoulder since yesterday). Negative for neck pain.   Neurological: Positive for dizziness. Negative for syncope and light-headedness.   Psychiatric/Behavioral: Positive for sleep disturbance (wakes with palps, cp and soa).       Objective   Vitals:    04/27/22 1053   BP: 118/77   BP Location: Left arm   Patient Position: Sitting   Pulse: 76   SpO2: 99%   Weight: 84.8 kg (187 lb)   Height: 167.6 cm (66\")      /77 (BP Location: Left arm, Patient Position: Sitting)   Pulse 76   Ht 167.6 cm (66\")   Wt 84.8 kg (187 lb)   SpO2 99%   BMI 30.18 kg/m²     Lab Results (most recent)     None          Physical Exam  Vitals and nursing note reviewed.   Constitutional:       General: She is not in acute distress.     Appearance: Normal appearance. She is well-developed.   HENT:      Head: Normocephalic and atraumatic.   Eyes:      General: No scleral icterus.        Right eye: No discharge.         Left eye: No discharge.      Conjunctiva/sclera: Conjunctivae normal.   Neck:      Vascular: No carotid bruit.   Cardiovascular:      Rate and Rhythm: Normal rate and regular rhythm.      Heart sounds: Normal heart sounds. No murmur heard.    No friction rub. No gallop.   Pulmonary:      Effort: Pulmonary effort is normal. No respiratory distress.      Breath sounds: Normal breath sounds. No wheezing or rales.   Chest:      Chest wall: No tenderness.   Musculoskeletal:      Right lower leg: No edema.      Left lower leg: No edema.   Skin:     General: Skin is warm and dry.      Coloration: Skin is not pale.      Findings: No " erythema or rash.   Neurological:      Mental Status: She is alert and oriented to person, place, and time.      Cranial Nerves: No cranial nerve deficit.   Psychiatric:         Behavior: Behavior normal.         Procedure     ECG 12 Lead    Date/Time: 4/27/2022 10:57 AM  Performed by: Harley Soares PA  Authorized by: Harley Soares PA   Comparison: compared with previous ECG from 2/8/2021  Comments: EKG demonstrates sinus arrhythmia at 67 bpm with no acute ST changes               Assessment/Plan     Problems Addressed this Visit        Cardiac and Vasculature    Palpitations    Relevant Orders    Tilt Table    CT Head Without Contrast    Chest pain - Primary    Relevant Orders    Tilt Table    CT Head Without Contrast       Pulmonary and Pneumonias    SOB (shortness of breath)    Relevant Orders    Tilt Table    CT Head Without Contrast       Symptoms and Signs    Syncope and collapse    Relevant Orders    Tilt Table    CT Head Without Contrast      Diagnoses       Codes Comments    Chest pain, unspecified type    -  Primary ICD-10-CM: R07.9  ICD-9-CM: 786.50     SOB (shortness of breath)     ICD-10-CM: R06.02  ICD-9-CM: 786.05     Palpitations     ICD-10-CM: R00.2  ICD-9-CM: 785.1     Syncope and collapse     ICD-10-CM: R55  ICD-9-CM: 780.2           Recommendation  1.  Patient is a 26-year-old female with chronic complaints.  I feel much of her history of syncope is likely related to neurocardiogenic issues.  She would like to have tilt table testing performed.  We will schedule accordingly    2.  Otherwise her cardiac testing has been largely benign.  I am placing her on a very low-dose 12.5 mg of Toprol daily.  She has any adverse effects, I recommend her stopping that medication as discussed    3.  She will like to have a CT of the head performed.  I do not see where she has had any head trauma.  We will schedule CT for rule out.  We will see her back for follow-up on testing.  Follow-up with primary  as scheduled           Patient brought in medicine bottles to appointment, they have been gone through with the patient. Med list was updated in the chart.                  Electronically signed by:

## 2022-05-18 ENCOUNTER — HOSPITAL ENCOUNTER (OUTPATIENT)
Dept: CARDIOLOGY | Facility: HOSPITAL | Age: 27
Discharge: HOME | End: 2022-05-18
Admitting: PHYSICIAN ASSISTANT

## 2022-05-18 VITALS
BODY MASS INDEX: 30.05 KG/M2 | DIASTOLIC BLOOD PRESSURE: 80 MMHG | HEART RATE: 72 BPM | SYSTOLIC BLOOD PRESSURE: 110 MMHG | HEIGHT: 66 IN | WEIGHT: 186.95 LBS

## 2022-05-18 DIAGNOSIS — R07.9 CHEST PAIN, UNSPECIFIED TYPE: ICD-10-CM

## 2022-05-18 DIAGNOSIS — R55 SYNCOPE AND COLLAPSE: ICD-10-CM

## 2022-05-18 DIAGNOSIS — R06.02 SOB (SHORTNESS OF BREATH): ICD-10-CM

## 2022-05-18 DIAGNOSIS — R00.2 PALPITATIONS: ICD-10-CM

## 2022-05-18 PROCEDURE — 93660 TILT TABLE EVALUATION: CPT

## 2022-05-18 PROCEDURE — 93660 TILT TABLE EVALUATION: CPT | Performed by: INTERNAL MEDICINE

## 2022-05-19 LAB
MAXIMAL PREDICTED HEART RATE: 194 BPM
STRESS TARGET HR: 165 BPM

## 2022-05-23 ENCOUNTER — TELEPHONE (OUTPATIENT)
Dept: CARDIOLOGY | Facility: CLINIC | Age: 27
End: 2022-05-23

## 2022-05-23 NOTE — TELEPHONE ENCOUNTER
TILT TABLE  Called pt to notify of no acute findings on testing, no answer lvm.  ----- Message from Chelsie Acevedo LPN sent at 5/23/2022 11:08 AM EDT -----    ----- Message -----  From: Harley Soares PA  Sent: 5/23/2022  10:55 AM EDT  To: Chelsie Acevedo LPN    Routine follow-up

## 2023-09-28 ENCOUNTER — TELEPHONE (OUTPATIENT)
Dept: CARDIOLOGY | Facility: CLINIC | Age: 28
End: 2023-09-28

## 2023-09-28 ENCOUNTER — OFFICE VISIT (OUTPATIENT)
Dept: CARDIOLOGY | Facility: CLINIC | Age: 28
End: 2023-09-28
Payer: COMMERCIAL

## 2023-09-28 VITALS
SYSTOLIC BLOOD PRESSURE: 155 MMHG | BODY MASS INDEX: 29.89 KG/M2 | OXYGEN SATURATION: 100 % | DIASTOLIC BLOOD PRESSURE: 94 MMHG | HEART RATE: 134 BPM | HEIGHT: 66 IN | WEIGHT: 186 LBS

## 2023-09-28 DIAGNOSIS — R42 DIZZINESS: ICD-10-CM

## 2023-09-28 DIAGNOSIS — R06.02 SOB (SHORTNESS OF BREATH): ICD-10-CM

## 2023-09-28 DIAGNOSIS — R00.2 PALPITATIONS: Primary | ICD-10-CM

## 2023-09-28 PROCEDURE — 93000 ELECTROCARDIOGRAM COMPLETE: CPT | Performed by: PHYSICIAN ASSISTANT

## 2023-09-28 PROCEDURE — 99214 OFFICE O/P EST MOD 30 MIN: CPT | Performed by: PHYSICIAN ASSISTANT

## 2023-09-28 RX ORDER — NEBIVOLOL 2.5 MG/1
2.5 TABLET ORAL DAILY
Qty: 90 TABLET | Refills: 3 | Status: SHIPPED | OUTPATIENT
Start: 2023-09-28

## 2023-09-28 RX ORDER — HYDRALAZINE HYDROCHLORIDE 10 MG/1
10 TABLET, FILM COATED ORAL AS NEEDED
COMMUNITY

## 2023-09-28 NOTE — PROGRESS NOTES
Problem list     Subjective   Wilma Patel is a 27 y.o. female     Chief Complaint   Patient presents with    Follow-up     5 month / testing review     Chest Pain    Shortness of Breath       HPI    Patient is a 27-year-old female who presents back to the office to be evaluated.    She presents back poorly.  She describes going to call the emergency room because of symptoms.  She has felt significant arm pain.  She has had dizziness and difficulty walking.    She is tearful on examination.  She has felt her heart racing.  She currently is lying in a stretcher at this time.  She does not describe significant pain although she has had some pain in the past.  She has felt significant arm pain.  Vitals have been reviewed.  She is stable at this time.      Current Outpatient Medications on File Prior to Visit   Medication Sig Dispense Refill    hydrALAZINE (APRESOLINE) 10 MG tablet Take 1 tablet by mouth As Needed.      [DISCONTINUED] buPROPion (WELLBUTRIN) 75 MG tablet Take 75 mg by mouth 2 (Two) Times a Day. (Patient not taking: Reported on 9/28/2023)      [DISCONTINUED] metoprolol succinate XL (TOPROL-XL) 25 MG 24 hr tablet Take 0.5 tablets by mouth Daily. (Patient not taking: Reported on 9/28/2023) 30 tablet 11     No current facility-administered medications on file prior to visit.       Latex and Macrobid [nitrofurantoin macrocrystal]    Past Medical History:   Diagnosis Date    Anxiety     Depression     Migraines        Social History     Socioeconomic History    Marital status: Single   Tobacco Use    Smoking status: Former     Years: 2.00     Types: Cigarettes    Smokeless tobacco: Never   Substance and Sexual Activity    Alcohol use: Never    Drug use: Never       Family History   Problem Relation Age of Onset    No Known Problems Mother     Hypertension Father        Review of Systems   Constitutional:  Positive for chills and fatigue. Negative for fever.   HENT: Negative.  Negative for congestion,  "rhinorrhea and sore throat.    Eyes: Negative.    Respiratory:  Positive for chest tightness and shortness of breath.    Cardiovascular:  Positive for palpitations. Negative for chest pain and leg swelling.   Gastrointestinal: Negative.    Endocrine: Negative.    Genitourinary: Negative.    Musculoskeletal: Negative.  Negative for arthralgias, back pain and neck pain.   Skin: Negative.  Negative for rash and wound.   Allergic/Immunologic: Negative.  Negative for environmental allergies.   Neurological:  Positive for dizziness and light-headedness. Negative for weakness, numbness and headaches.   Hematological: Negative.  Does not bruise/bleed easily.   Psychiatric/Behavioral: Negative.  Negative for sleep disturbance.      Objective   Vitals:    09/28/23 0852   BP: 155/94   Pulse: (!) 134   SpO2: 100%   Weight: 84.4 kg (186 lb)   Height: 167.6 cm (65.98\")      /94   Pulse (!) 134   Ht 167.6 cm (65.98\")   Wt 84.4 kg (186 lb)   SpO2 100%   BMI 30.04 kg/m²     Lab Results (most recent)       None            Physical Exam  Vitals and nursing note reviewed.   Constitutional:       General: She is not in acute distress.     Appearance: Normal appearance. She is well-developed.   HENT:      Head: Normocephalic and atraumatic.   Eyes:      General: No scleral icterus.        Right eye: No discharge.         Left eye: No discharge.      Conjunctiva/sclera: Conjunctivae normal.   Neck:      Vascular: No carotid bruit.   Cardiovascular:      Rate and Rhythm: Regular rhythm. Tachycardia present.      Heart sounds: Normal heart sounds. No murmur heard.    No friction rub. No gallop.   Pulmonary:      Effort: Pulmonary effort is normal. No respiratory distress.      Breath sounds: Normal breath sounds. No wheezing or rales.   Chest:      Chest wall: No tenderness.   Musculoskeletal:      Right lower leg: No edema.      Left lower leg: No edema.   Skin:     General: Skin is warm and dry.      Coloration: Skin is not " pale.      Findings: No erythema or rash.   Neurological:      Mental Status: She is alert and oriented to person, place, and time.      Cranial Nerves: No cranial nerve deficit.   Psychiatric:         Behavior: Behavior normal.       Procedure     ECG 12 Lead    Date/Time: 9/28/2023 9:01 AM  Performed by: Harley Soares PA  Authorized by: Harley Soares PA   Comparison: compared with previous ECG from 4/27/2022  Comments: EKG demonstrates sinus tachycardia at 119 bpm with no acute ST changes           Assessment & Plan     Problems Addressed this Visit          Cardiac and Vasculature    Palpitations - Primary    Relevant Orders    Mobile Cardiac Outpatient Telemetry       Pulmonary and Pneumonias    SOB (shortness of breath)       Symptoms and Signs    Dizziness    Relevant Orders    Mobile Cardiac Outpatient Telemetry     Diagnoses         Codes Comments    Palpitations    -  Primary ICD-10-CM: R00.2  ICD-9-CM: 785.1     Dizziness     ICD-10-CM: R42  ICD-9-CM: 780.4     SOB (shortness of breath)     ICD-10-CM: R06.02  ICD-9-CM: 786.05               Recommendation  1.  Patient is a 27-year-old female who presents to the office to be evaluated.  Patient has palpitations and shortness of breath.  She also complains of significant dizziness.  She describes that she has difficulty walking.  She feels she needs emergency room evaluation.  Because of her inability to walk safely due to significant dizziness, we will have her transported to the emergency room via EMS.    2.  I do want her to wear an event monitor and we will order that because of her palpitations.  We can see her back for follow-up after emergency room.  Follow-up with primary as scheduled.              Electronically signed by:

## 2023-09-28 NOTE — LETTER
September 28, 2023       No Recipients    Patient: Wilma Patel   YOB: 1995   Date of Visit: 9/28/2023       Dear BOBBI Swartz    Wilma Patel was in my office today. Below is a copy of my note.    If you have questions, please do not hesitate to call me. I look forward to following Wilma along with you.         Sincerely,        CATHY Reid        CC:   No Recipients    Problem list     Subjective  Wilma Patel is a 27 y.o. female     Chief Complaint   Patient presents with   • Follow-up     5 month / testing review    • Chest Pain   • Shortness of Breath       HPI    Patient is a 27-year-old female who presents back to the office to be evaluated.    She presents back poorly.  She describes going to call the emergency room because of symptoms.  She has felt significant arm pain.  She has had dizziness and difficulty walking.    She is tearful on examination.  She has felt her heart racing.  She currently is lying in a stretcher at this time.  She does not describe significant pain although she has had some pain in the past.  She has felt significant arm pain.  Vitals have been reviewed.  She is stable at this time.      Current Outpatient Medications on File Prior to Visit   Medication Sig Dispense Refill   • hydrALAZINE (APRESOLINE) 10 MG tablet Take 1 tablet by mouth As Needed.     • [DISCONTINUED] buPROPion (WELLBUTRIN) 75 MG tablet Take 75 mg by mouth 2 (Two) Times a Day. (Patient not taking: Reported on 9/28/2023)     • [DISCONTINUED] metoprolol succinate XL (TOPROL-XL) 25 MG 24 hr tablet Take 0.5 tablets by mouth Daily. (Patient not taking: Reported on 9/28/2023) 30 tablet 11     No current facility-administered medications on file prior to visit.       Latex and Macrobid [nitrofurantoin macrocrystal]    Past Medical History:   Diagnosis Date   • Anxiety    • Depression    • Migraines        Social History     Socioeconomic History   • Marital status: Single   Tobacco  "Use   • Smoking status: Former     Years: 2.00     Types: Cigarettes   • Smokeless tobacco: Never   Substance and Sexual Activity   • Alcohol use: Never   • Drug use: Never       Family History   Problem Relation Age of Onset   • No Known Problems Mother    • Hypertension Father        Review of Systems   Constitutional:  Positive for chills and fatigue. Negative for fever.   HENT: Negative.  Negative for congestion, rhinorrhea and sore throat.    Eyes: Negative.    Respiratory:  Positive for chest tightness and shortness of breath.    Cardiovascular:  Positive for palpitations. Negative for chest pain and leg swelling.   Gastrointestinal: Negative.    Endocrine: Negative.    Genitourinary: Negative.    Musculoskeletal: Negative.  Negative for arthralgias, back pain and neck pain.   Skin: Negative.  Negative for rash and wound.   Allergic/Immunologic: Negative.  Negative for environmental allergies.   Neurological:  Positive for dizziness and light-headedness. Negative for weakness, numbness and headaches.   Hematological: Negative.  Does not bruise/bleed easily.   Psychiatric/Behavioral: Negative.  Negative for sleep disturbance.      Objective  Vitals:    09/28/23 0852   BP: 155/94   Pulse: (!) 134   SpO2: 100%   Weight: 84.4 kg (186 lb)   Height: 167.6 cm (65.98\")      /94   Pulse (!) 134   Ht 167.6 cm (65.98\")   Wt 84.4 kg (186 lb)   SpO2 100%   BMI 30.04 kg/m²     Lab Results (most recent)       None            Physical Exam  Vitals and nursing note reviewed.   Constitutional:       General: She is not in acute distress.     Appearance: Normal appearance. She is well-developed.   HENT:      Head: Normocephalic and atraumatic.   Eyes:      General: No scleral icterus.        Right eye: No discharge.         Left eye: No discharge.      Conjunctiva/sclera: Conjunctivae normal.   Neck:      Vascular: No carotid bruit.   Cardiovascular:      Rate and Rhythm: Regular rhythm. Tachycardia present.      " Heart sounds: Normal heart sounds. No murmur heard.    No friction rub. No gallop.   Pulmonary:      Effort: Pulmonary effort is normal. No respiratory distress.      Breath sounds: Normal breath sounds. No wheezing or rales.   Chest:      Chest wall: No tenderness.   Musculoskeletal:      Right lower leg: No edema.      Left lower leg: No edema.   Skin:     General: Skin is warm and dry.      Coloration: Skin is not pale.      Findings: No erythema or rash.   Neurological:      Mental Status: She is alert and oriented to person, place, and time.      Cranial Nerves: No cranial nerve deficit.   Psychiatric:         Behavior: Behavior normal.       Procedure    ECG 12 Lead    Date/Time: 9/28/2023 9:01 AM  Performed by: Harley Soares PA  Authorized by: Harley Soares PA   Comparison: compared with previous ECG from 4/27/2022  Comments: EKG demonstrates sinus tachycardia at 119 bpm with no acute ST changes           Assessment & Plan    Problems Addressed this Visit          Cardiac and Vasculature    Palpitations - Primary    Relevant Orders    Mobile Cardiac Outpatient Telemetry       Pulmonary and Pneumonias    SOB (shortness of breath)       Symptoms and Signs    Dizziness    Relevant Orders    Mobile Cardiac Outpatient Telemetry     Diagnoses         Codes Comments    Palpitations    -  Primary ICD-10-CM: R00.2  ICD-9-CM: 785.1     Dizziness     ICD-10-CM: R42  ICD-9-CM: 780.4     SOB (shortness of breath)     ICD-10-CM: R06.02  ICD-9-CM: 786.05               Recommendation  1.  Patient is a 27-year-old female who presents to the office to be evaluated.  Patient has palpitations and shortness of breath.  She also complains of significant dizziness.  She describes that she has difficulty walking.  She feels she needs emergency room evaluation.  Because of her inability to walk safely due to significant dizziness, we will have her transported to the emergency room via EMS.    2.  I do want her to wear an  event monitor and we will order that because of her palpitations.  We can see her back for follow-up after emergency room.  Follow-up with primary as scheduled.              Electronically signed by:

## 2023-09-28 NOTE — TELEPHONE ENCOUNTER
Per Harley Soares,PAC  Please order patient Bystolic 2.5 mg daily.    Patient aware to monitor and call back x1 week.

## 2023-10-02 ENCOUNTER — TELEPHONE (OUTPATIENT)
Dept: CARDIOLOGY | Facility: CLINIC | Age: 28
End: 2023-10-02
Payer: COMMERCIAL

## 2023-10-02 NOTE — TELEPHONE ENCOUNTER
Received notification from [employer] they had any transmissions from the pt's monitor. S/w pt she stated that she is wearing the monitor, she states she is not seeing any blinking orange light. I advised her to contact [employer] to see if they can trouble shoot. I gave patient the # to contact them.

## 2023-10-05 ENCOUNTER — TELEPHONE (OUTPATIENT)
Dept: CARDIOLOGY | Facility: CLINIC | Age: 28
End: 2023-10-05
Payer: COMMERCIAL

## 2023-10-05 NOTE — TELEPHONE ENCOUNTER
Spoke to patient she stated red irradiated itching skin with blisters , she will try to wear until Saturday to make 1 week.     Is OK for patient to removed and send back , advised to keep area clean and let heal.       AT [facility]

## 2023-10-05 NOTE — TELEPHONE ENCOUNTER
Caller: Wilma Patel    Relationship: Self    Best call back number: [phone]    What is the best time to reach you: ANYTIME    What was the call regarding: PATIENT IS HAVING A REACTION TO THE [employer]ES[employer] ON THE MONITOR AND WOULD LIKE TO KNOW IF SHE CAN TAKE IT OFF ON SATURDAY BECAUSE THAT WILL BE 1 WEEK.

## 2023-10-09 ENCOUNTER — TELEPHONE (OUTPATIENT)
Dept: CARDIOLOGY | Facility: CLINIC | Age: 28
End: 2023-10-09

## 2023-10-09 NOTE — TELEPHONE ENCOUNTER
Caller: KIMBERLYN PERRY    Relationship: Provider    Best call back number: [phone]     What is the best time to reach you: ANYTIME, OPEN 24/7    What was the call regarding: PTS [employer] [facility] HAS STOPPED TRANSMITTING, UNKNOWN WHAT HAPPENED. THEY CAN NOT GET HOLD OF THE PT. NO DATA RECEIVED IN THE LAST 24 HOURS. PLEASE LET [employer] KNOW IF THEY ARE NEEDING A NEW MONITOR, OR IF THE PT NEEDS TROUBLE SHOOTING PLEASE REACH OU TO THE COMPANY.

## 2023-10-10 NOTE — TELEPHONE ENCOUNTER
Left vm for pt to return my call or call [employer] about her monitor. Asking if she had to take monitor off? Fell off?

## 2023-10-10 NOTE — TELEPHONE ENCOUNTER
S/w pt she stated she s/w clinical staff at our office and was told could remove the monitor after Saturday due to irritation. Pt stated that she also s/w someone at [facility].

## 2023-11-01 ENCOUNTER — TELEPHONE (OUTPATIENT)
Dept: CARDIOLOGY | Facility: CLINIC | Age: 28
End: 2023-11-01
Payer: COMMERCIAL

## 2023-11-01 NOTE — TELEPHONE ENCOUNTER
MONITOR  Pt notified of no acute findings. Provider will discuss results at f/u. Pt reminded of appt date and time.  ----- Message from CATHY Vila sent at 10/30/2023  8:57 AM EDT -----  Routine follow-up